# Patient Record
Sex: FEMALE | Race: WHITE | URBAN - METROPOLITAN AREA
[De-identification: names, ages, dates, MRNs, and addresses within clinical notes are randomized per-mention and may not be internally consistent; named-entity substitution may affect disease eponyms.]

---

## 2022-04-05 ENCOUNTER — OFFICE VISIT (OUTPATIENT)
Dept: PRIMARY CARE CLINIC | Age: 27
End: 2022-04-05
Payer: MEDICAID

## 2022-04-05 VITALS — DIASTOLIC BLOOD PRESSURE: 70 MMHG | OXYGEN SATURATION: 98 % | HEART RATE: 78 BPM | SYSTOLIC BLOOD PRESSURE: 110 MMHG

## 2022-04-05 DIAGNOSIS — F43.10 PTSD (POST-TRAUMATIC STRESS DISORDER): Primary | ICD-10-CM

## 2022-04-05 DIAGNOSIS — Z78.9 USES DEPO-PROVERA AS PRIMARY BIRTH CONTROL METHOD: ICD-10-CM

## 2022-04-05 DIAGNOSIS — N92.6 IRREGULAR PERIODS: ICD-10-CM

## 2022-04-05 DIAGNOSIS — F32.1 MODERATE MAJOR DEPRESSION (HCC): ICD-10-CM

## 2022-04-05 PROCEDURE — 99204 OFFICE O/P NEW MOD 45 MIN: CPT | Performed by: NURSE PRACTITIONER

## 2022-04-05 RX ORDER — MEDROXYPROGESTERONE ACETATE 150 MG/ML
150 INJECTION, SUSPENSION INTRAMUSCULAR
Qty: 1 EACH | Refills: 3 | Status: SHIPPED | OUTPATIENT
Start: 2022-04-05

## 2022-04-05 RX ORDER — QUETIAPINE FUMARATE 25 MG/1
25 TABLET, FILM COATED ORAL NIGHTLY
Qty: 90 TABLET | Refills: 0 | Status: SHIPPED | OUTPATIENT
Start: 2022-04-05

## 2022-04-05 ASSESSMENT — ENCOUNTER SYMPTOMS
VOMITING: 0
COUGH: 0
DIARRHEA: 0
SHORTNESS OF BREATH: 0
CONSTIPATION: 0
ABDOMINAL PAIN: 0
NAUSEA: 0

## 2022-04-05 ASSESSMENT — PATIENT HEALTH QUESTIONNAIRE - PHQ9
6. FEELING BAD ABOUT YOURSELF - OR THAT YOU ARE A FAILURE OR HAVE LET YOURSELF OR YOUR FAMILY DOWN: 1
9. THOUGHTS THAT YOU WOULD BE BETTER OFF DEAD, OR OF HURTING YOURSELF: 0
SUM OF ALL RESPONSES TO PHQ QUESTIONS 1-9: 15
SUM OF ALL RESPONSES TO PHQ9 QUESTIONS 1 & 2: 4
SUM OF ALL RESPONSES TO PHQ QUESTIONS 1-9: 15
SUM OF ALL RESPONSES TO PHQ QUESTIONS 1-9: 15
4. FEELING TIRED OR HAVING LITTLE ENERGY: 2
10. IF YOU CHECKED OFF ANY PROBLEMS, HOW DIFFICULT HAVE THESE PROBLEMS MADE IT FOR YOU TO DO YOUR WORK, TAKE CARE OF THINGS AT HOME, OR GET ALONG WITH OTHER PEOPLE: 2
2. FEELING DOWN, DEPRESSED OR HOPELESS: 2
7. TROUBLE CONCENTRATING ON THINGS, SUCH AS READING THE NEWSPAPER OR WATCHING TELEVISION: 2
3. TROUBLE FALLING OR STAYING ASLEEP: 2
1. LITTLE INTEREST OR PLEASURE IN DOING THINGS: 2
5. POOR APPETITE OR OVEREATING: 2
SUM OF ALL RESPONSES TO PHQ QUESTIONS 1-9: 15
8. MOVING OR SPEAKING SO SLOWLY THAT OTHER PEOPLE COULD HAVE NOTICED. OR THE OPPOSITE, BEING SO FIGETY OR RESTLESS THAT YOU HAVE BEEN MOVING AROUND A LOT MORE THAN USUAL: 2

## 2022-04-05 NOTE — PATIENT INSTRUCTIONS
for 5 to 10 minutes. Notice the feeling of calmness throughout your whole body. As you continue to breathe slowly and deeply, relax by doing the following foranother 5 to 10 minutes:   Tighten and relax each muscle group in your body. You can begin at your toes and work your way up to your head.  Imagine your muscle groups relaxing and becoming heavy.  Empty your mind of all thoughts.  Let yourself relax more and more deeply.  Become aware of the state of calmness that surrounds you.  When your relaxation time is over, you can bring yourself back to alertness by moving your fingers and toes and then your hands and feet and then stretching and moving your entire body. Sometimes people fall asleep during relaxation, but they usually wake up shortly afterward.  Always give yourself time to return to full alertness before you drive a car or do anything that might cause an accident if you are not fully alert. Never play a relaxation tape while you drive a car. When should you call for help? Call 911 anytime you think you may need emergency care. For example, call if:     You feel you cannot stop from hurting yourself or someone else. Call the [address] [place] [address] at [phone] if you orsomeone you know is:     Feeling hopeless or thinking of hurting or killing themselves. Watch closely for changes in your health, and be sure to contact your doctor if:     Your PTSD symptoms are getting worse.      You have new or worse symptoms of anxiety or depression.      You are not getting better as expected. Where can you learn more? Go to https://SkyTechrene.[email or web address] org and sign in to your [employer] account. Enter Q773 in the [facility] box to learn more about \"Post-Traumatic Stress Disorder (PTSD): Care Instructions. \"     If you do not have an account, please click on the \"Sign Up Now\" link.   Current as of: June 16, 2021               Content Version: 13.2  © 3477-5145 Healthwise, Incorporated. Care instructions adapted under license by [facility] ([facility]). If you have questions about a medical condition or this instruction, always ask your healthcare professional. [address] any warranty or liability for your use of this information. Patient Education        Shot for [employer] Control: Care Instructions  Your Care Instructions  The shot is used to prevent pregnancy. You get the shot in your upper arm or rear end (buttocks). The shot gives you a dose of the hormone progestin. Theshot is often called by its brand name, Depo-Provera. The shot provides birth control for 3 months at a time. You then need anothershot. Follow-up care is a key part of your treatment and safety. Be sure to make and go to all appointments, and call your doctor if you are having problems. It's also a good idea to know your test results and keep alist of the medicines you take. How can you care for yourself at home? How do you use the birth control shot?  If you get the shot within the first 7 days of starting your normal period, you are protected from pregnancy right away.  If you get the shot more than 7 days after your period starts, use back up birth control, such as a condom, or don't have intercourse for 7 days.  Talk to your doctor about a schedule to get the shot. You need the shot every 3 months. If you are late getting it, you'll need backup birth control. What if you miss or are late for a shot? Always read the label for specific instructions, or call your doctor. Here aresome basic guidelines:   Use backup birth control, such as a condom, or don't have intercourse. Continue using one of these methods until 7 days after you get the missed or late shot.  If you had intercourse and you don't want to get pregnant, you can use emergency contraception. The most effective emergency contraception is an IUD (inserted by a doctor).  You can also get emergency contraceptive pills. You can get them with a prescription from your doctor or without a prescription at most drugstores. What else do you need to know?  The shot can have side effects. ? You may have changes in your period and your period may stop. You may also have spotting or bleeding between periods. ? You may have mood changes, less interest in sex, or weight gain.  The shot may cause bone loss. Talk to your doctor about the risks and benefits of using the shot.  Check with your doctor before you use any other medicines, including over-the-counter medicines, vitamins, herbal products, and supplements. Birth control hormones may not work as well to prevent pregnancy when combined with other medicines.  The shot doesn't protect against sexually transmitted infections (STIs), such as herpes or HIV/AIDS. If you're not sure whether your sex partner might have an STI, use a condom to protect against infection. When should you call for help? Watch closely for changes in your health, and be sure to contact your doctor if you have any problems. Where can you learn more? Go to https://Calleoo.healthBreakthrough [email or web address] org and sign in to your [place] account. Enter L761 in the [employer] box to learn more about \"Shot for Birth Control: Care Instructions. \"     If you do not have an account, please click on the \"Sign Up Now\" link. Current as of: June 16, 2021               Content Version: 13.2  © 2006-2022 Healthwise, Incorporated. Care instructions adapted under license by [facility] ([facility]). If you have questions about a medical condition or this instruction, always ask your healthcare professional. [place] [address] any warranty or liability for your use of this information. Patient Education        Learning About Birth Control  What is birth control? Birth control is any method used to prevent pregnancy. Another word for birthcontrol is contraception.   If you have sex without birth control, there is a chance that you could get pregnant. This is true even if you have not started having periods yet or youare getting close to menopause. The only sure way to prevent pregnancy is to not have sex. But finding a good method of birth control that you are comfortable with can help you avoid anunplanned pregnancy. Be sure to tell your doctor about any health problems you have or medicines you take. He or she can help you choose the birth control method that is right foryou. What are the types of birth control? There are many different kinds of birth control. Each has pros and cons. Learning about all the methods will help you find one that is right for you.  Long-acting reversible contraception (LARC) is the most effective reversible method you can use to prevent pregnancy. If you decide you want to get pregnant, you can have them removed. LARCs are implants and intrauterine devices (IUDs). While they are being used, they usually prevent pregnancy for years. ? Implants are placed under the skin of the arm. They release the hormone progestin and prevent pregnancy for about 3 years. ? IUDs are placed in the uterus by a doctor. There are two main types of IUDs--the copper IUD and the hormonal IUD. The hormonal IUD releases progestin. IUDs prevent pregnancy for 3 to 10 years, depending on the type.  Hormonal methods are very good at preventing pregnancy. Combination birth control pills (\"the pill\"), skin patches, and vaginal rings release the hormones estrogen and progestin. Shots, mini-pills, hormonal IUDs, and implants release progestin only.  Barrier methods generally do not prevent pregnancy as well as IUDs or hormonal methods do. Barrier methods include condoms, diaphragms, cervical caps, and sponges. You must use barrier methods every time you have sex.  Natural family planning can work if you and your partner are careful and you have a regular ovulation cycle.  You will need to keep records so you know when you are most likely to become pregnant (you are fertile). And during times you are fertile, you will need to not have sex or to use a barrier method. Natural family planning is also known as fertility awareness and the rhythm method.  Permanent birth control (sterilization) gives you lasting protection against pregnancy. A man can have a vasectomy, or a woman can have her tubes tied (tubal ligation). But this is only a good choice if you are sure that you don't want any (or any more) children.  Emergency contraception is a backup method to prevent pregnancy if you didn't use birth control or a condom breaks. The most effective emergency contraception is an IUD (inserted by a doctor). You can also get emergency contraceptive pills. You can get them with a prescription from your doctor or without a prescription at most drugstores. How do you choose the best method? The best method of birth control is one that protects you every time you have sex. This usually depends on how well you use it. To find a method that willwork best for you, think about:   How well it works. Think about how important it is to you to avoid pregnancy. Then look at how well each method works. For example, if you plan to have a child soon anyway, you may not need a very reliable method. If you don't want children but feel it is wrong to end a pregnancy, choose a type of birth control that works very well.  How much effort it takes. For example, birth control pills may not be a good choice if you often forget to take medicine. Or, if you are not sure you will stop and use a barrier method each time you have sex, pick another method.  How much the method costs. For example, condoms are cheap or free in some clinics. Some insurance companies cover the cost of prescription birth control. But cost can sometimes be misleading. An IUD costs a lot up front.  But it works for years, making it low-cost over time.   Whether it protects you from infection. Latex condoms can help protect you from sexually transmitted infections (STIs), such as herpes or HIV/AIDS. But they are not the best way to prevent pregnancy. To avoid both STIs and pregnancy, use condoms along with another type of birth control.  Whether you've had a problem with one kind of birth control. Finding the best method of birth control may involve trying something different. Also, you may need to change a method that once worked well for you.  Whether you want children. If you are positive you don't want children, a lasting method of birth control might be best.   Your health issues. Some birth control methods may not be safe for you, depending on your health issues. For example, women who smoke, are breastfeeding, or have had breast cancer may not be able to use certain methods. How can you get birth control?  You can buy:  ? Condoms, sponges, and spermicides without a prescription in drugstores, online, and in many grocery stores. ? Some forms of emergency contraception without a prescription at most drugstores.  You need to see a doctor or visit a family planning clinic to:  ? Get a prescription for birth control pills and other methods that use hormones. ? Have an implant or IUD inserted, including the type of IUD used for emergency contraception. ? Get a hormone shot. ? Get a prescription for a diaphragm or cervical cap. ? Get a prescription for certain kinds of emergency contraception. Where can you learn more? Go to https://mandy.healthViropropartners. org and sign in to your [employer] account. Enter U285 in the [facility] box to learn more about \"Learning About Birth Control. \"     If you do not have an account, please click on the \"Sign Up Now\" link. Current as of: June 16, 2021               Content Version: 13.2  © 5391-6042 Healthwise, [facility]. Care instructions adapted under license by [facility] ([facility]).  If you have questions about a medical condition or this instruction, always ask your healthcare professional. [address] any warranty or liability for your use of this information. Patient Education        Patch for Birth Control: Care Instructions  Overview     The patch is used to prevent pregnancy. It looks like a bandage. It gives you aregular dose of the hormones estrogen and progestin. The patch comes in packs of three. You change the patch once a week for 3 weeks and then go without a patch for 1 week. During this week, you have your period. One pack provides birth control for 1 month. You may use the patch continuously, without stopping for a week each month. With this method, youwon't have your period. Follow-up care is a key part of your treatment and safety. Be sure to make and go to all appointments, and call your doctor if you are having problems. It's also a good idea to know your test results and keep alist of the medicines you take. How can you care for yourself at home? How do you use the patch?  Talk to your doctor about the best day to start using the patch. Ask if you will need to use backup birth control, such as a condom, or avoid intercourse for a period of time.  Always follow the directions that came with the patch. In general:  ? Use the patch 3 out of 4 weeks. Put on a new patch every week on the same day of the week. The 4th week, don't wear a patch. You'll have your period. If you're using the patch continuously, put on a new patch every week, without skipping a week each month. Do this on the same day every week. ? Put the patch on your lower belly, buttocks, or upper body. Don't put it on your breasts. ? Don't put lotions, oils, powders, or makeup on the area where you're going to put the patch. They could keep the patch from sticking. ? Try not to place the patch under bra straps. What if you forget a patch or it falls off?   Always read the label for specific instructions, or call your doctor. Here aresome basic guidelines:   In the first week, if you forget a patch or are late, put on a patch right away and:  ? Use backup birth control, such as a condom, or don't have intercourse for 7 days. ? If you had intercourse, you can use emergency contraception as a way to prevent pregnancy. The most effective emergency contraception is an IUD (inserted by a doctor). You can also get emergency contraceptive pills. You can get them with a prescription from your doctor or without a [facility].  In the second and third weeks:  ? If you are less than 48 hours late, put on a new patch right away. You don't need backup birth control or emergency contraception. ? If you are late by 48 hours or more, put on a new patch right away. Use backup birth control or don't have intercourse for 7 days. If you had intercourse, you can use emergency contraception.  If a patch doesn't stick well or falls off:  ? For less than 48 hours, put it back on. If it doesn't stick well, use a new patch. ? For 48 hours or more, put on a new patch right away. Use backup birth control or don't have intercourse for 7 days. If you had intercourse, ask your doctor about using emergency contraception. What else do you need to know?  The patch can have side effects. ? You may have very light or skipped periods. ? You may have bleeding between periods (spotting). This usually decreases after 3 to 4 months. If you're using the patch continuously, you won't have periods. But you may still have breakthrough bleeding. Talk to your doctor if you have problems with breakthrough bleeding. Even if you have this bleeding, the patch should still work well.  ? You may have mood changes, less interest in sex, or weight gain.  Avoid exposing the patch to heat. This includes direct sunlight and using tanning beds, heating pads, electric blankets, hot tubs, or saunas.  Direct sunlight or high heat changes how the patch releases hormones. This makes the chance of getting pregnant more likely.  To help remember to put a new patch on:  ? [place] on a calendar the days you need to change the patch. ? Set up a reminder using your computer or other similar device.  Check with your doctor before you use any other medicines, including over-the-counter medicines, vitamins, herbal products, and supplements. Birth control hormones may not work as well to prevent pregnancy when combined with other medicines.  The patch doesn't protect against sexually transmitted infections (STIs), such as herpes or HIV/AIDS. If you're not sure whether your sex partner(s) might have an STI, use a condom to help protect against disease. When should you call for help? Call your doctor now or seek immediate medical care if:     You have severe belly pain.      You have signs of a blood clot, such as:  ? Pain in your calf, back of the knee, thigh, or groin. ? Redness and swelling in your leg or groin.      You have blurred vision or other problems seeing.      You have a severe headache.      You have severe trouble breathing. Watch closely for changes in your health, and be sure to contact your doctor if:     You think you might be pregnant.      You think you may be depressed.      You think you may have been exposed to or have a sexually transmitted infection. Where can you learn more? Go to https://chrene.healthDoApp. org and sign in to your [employer] account. Enter [phone] in the [facility] box to learn more about \"Patch for Birth Control: Care Instructions. \"     If you do not have an account, please click on the \"Sign Up Now\" link. Current as of: November 22, 2021               Content Version: 13.2  © 7929-2036 Healthwise, [employer]. Care instructions adapted under license by [facility] ([facility]).  If you have questions about a medical condition or this instruction, always ask your healthcare professional. [address] any warranty or liability for your use of this information. Patient Education        Learning About Birth Control: The Patch  What is the patch? The patch is used to prevent pregnancy. It looks like a bandage. You put it on the skin of your belly, rear end (buttocks), upper arm, or upper body (but noton a breast). The patch releases a regular dose of the hormones estrogen and progestin. These hormones prevent pregnancy in three ways. They thicken the mucus in the cervix. This makes it hard for sperm to travel into the uterus. They thin the lining of the uterus, which makes it harder for a fertilized egg to attach to the uterus. The hormones also can stop the ovaries from releasing an egg each month(ovulation). The patch provides birth control for 1 month at a time. You change the patch once a week for 3 weeks and then go without a patch for 1 week. During this week, you have your period. Your period may be very light. You also may use the patch continuously, without stopping for a week each month. With this method,you won't have your period. How well does it work? In the first year of use:   When the patch is used exactly as directed, fewer than 1 person out of 100 has an unplanned pregnancy.  When the patch is not used exactly as directed, 9 people out of 100 have an unplanned pregnancy. Be sure to tell your doctor about any health problems you have or medicines you take. Your doctor can help you choose the birth control method that is rightfor you. What are the advantages of the patch?  The patch is more effective for preventing pregnancy than barrier methods of birth control, such as the condom or diaphragm.  It may reduce acne, heavy bleeding and cramping, and symptoms of premenstrual syndrome.  The patch may be used continuously, without stopping for a week each month.  This protects against pregnancy and is also a safe way to avoid having your period. This may help if you have painful periods.  It's convenient. You put it on only 3 times each month. You don't have to interrupt sex to protect against pregnancy.  It's easy to check to see if you forgot to put one on. What are the disadvantages of the patch?  The patch doesn't protect against sexually transmitted infections (STIs), such as herpes or HIV/AIDS. If you aren't sure if your sex partner(s) might have an STI, use a condom to help protect against disease.  The patch may cause changes in your period. You may have little bleeding, skipped periods, or spotting. If you use the patch continuously, without stopping for a week each month, your periods will stop. But you may still have breakthrough bleeding. This usually isn't harmful and may decrease over time.  It may cause mood changes, less interest in sex, or weight gain.  The patch contains estrogen. It may not be right for you if you have certain health problems or concerns.  It may increase your risk of blood clots.  It may be less effective in women who are overweight.  You must remember to change the patch on schedule. Where can you learn more? Go to https://Anatolepepiceweb.healthBUSINESS INTELLIGENCE [email or web address]. org and sign in to your [employer] account. Enter R238 in the [facility] box to learn more about \"Learning About Birth Control: The Patch. \"     If you do not have an account, please click on the \"Sign Up Now\" link. Current as of: November 22, 2021               Content Version: 13.2  © 2006-2022 Healthwise, Incorporated. Care instructions adapted under license by [facility] ([facility]). If you have questions about a medical condition or this instruction, always ask your healthcare professional. [place] [address] any warranty or liability for your use of this information.
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
## 2022-04-08 ENCOUNTER — TELEPHONE (OUTPATIENT)
Dept: PRIMARY CARE CLINIC | Age: 27
End: 2022-04-08

## 2022-04-08 ENCOUNTER — NURSE ONLY (OUTPATIENT)
Dept: PRIMARY CARE CLINIC | Age: 27
End: 2022-04-08
Payer: MEDICAID

## 2022-04-08 DIAGNOSIS — Z30.013 ENCOUNTER FOR INITIAL PRESCRIPTION OF INJECTABLE CONTRACEPTIVE: ICD-10-CM

## 2022-04-08 DIAGNOSIS — Z78.9 USES DEPO-PROVERA AS PRIMARY BIRTH CONTROL METHOD: Primary | ICD-10-CM

## 2022-04-08 DIAGNOSIS — F51.5 NIGHTMARES ASSOCIATED WITH CHRONIC POST-TRAUMATIC STRESS DISORDER: Primary | ICD-10-CM

## 2022-04-08 DIAGNOSIS — F43.12 NIGHTMARES ASSOCIATED WITH CHRONIC POST-TRAUMATIC STRESS DISORDER: Primary | ICD-10-CM

## 2022-04-08 PROCEDURE — 96372 THER/PROPH/DIAG INJ SC/IM: CPT | Performed by: NURSE PRACTITIONER

## 2022-04-08 RX ORDER — PRAZOSIN HYDROCHLORIDE 1 MG/1
1 CAPSULE ORAL NIGHTLY
Qty: 30 CAPSULE | Refills: 3 | Status: SHIPPED | OUTPATIENT
Start: 2022-04-08

## 2022-04-08 RX ORDER — MEDROXYPROGESTERONE ACETATE 150 MG/ML
150 INJECTION, SUSPENSION INTRAMUSCULAR ONCE
Status: COMPLETED | OUTPATIENT
Start: 2022-04-08 | End: 2022-04-08

## 2022-04-08 RX ADMIN — MEDROXYPROGESTERONE ACETATE 150 MG: 150 INJECTION, SUSPENSION INTRAMUSCULAR at 16:36

## 2022-04-08 NOTE — PROGRESS NOTES
PT IS HERE FOR GENE SIGHT TESTING AND DEPRO PREVERA INJECTION     Administrations This Visit     medroxyPROGESTERone (DEPO-PROVERA) injection 150 mg     Admin Date  04/08/2022  16:36 Action  Given Dose  150 mg Route  IntraMUSCular Site  Deltoid Left Administered By  Sammy Malin MA    Ordering Provider: CHARIS Walker CNP    Patient Supplied?: Yes    Comments: PT BROUGHT OWN MED